# Patient Record
Sex: MALE | Race: WHITE | Employment: OTHER | URBAN - METROPOLITAN AREA
[De-identification: names, ages, dates, MRNs, and addresses within clinical notes are randomized per-mention and may not be internally consistent; named-entity substitution may affect disease eponyms.]

---

## 2023-08-03 DIAGNOSIS — M19.90 OSTEOARTHRITIS, UNSPECIFIED OSTEOARTHRITIS TYPE, UNSPECIFIED SITE: ICD-10-CM

## 2023-08-03 PROBLEM — L03.90 CELLULITIS: Status: ACTIVE | Noted: 2023-08-03

## 2023-08-03 PROBLEM — M54.50 LOWER BACK PAIN: Status: ACTIVE | Noted: 2023-08-03

## 2023-08-03 PROBLEM — H81.399 VESTIBULAR VERTIGO: Status: ACTIVE | Noted: 2023-08-03

## 2023-08-03 PROBLEM — S81.802A WOUND OF LEFT LOWER EXTREMITY: Status: ACTIVE | Noted: 2023-08-03

## 2023-08-03 PROBLEM — R21 RASH: Status: ACTIVE | Noted: 2023-08-03

## 2023-08-03 PROBLEM — R05.9 COUGH IN ADULT: Status: ACTIVE | Noted: 2023-08-03

## 2023-08-03 PROBLEM — J01.10 ACUTE FRONTAL SINUSITIS: Status: ACTIVE | Noted: 2023-08-03

## 2023-08-03 PROBLEM — J30.9 ALLERGIC RHINITIS: Status: ACTIVE | Noted: 2023-08-03

## 2023-08-03 PROBLEM — J20.9 ACUTE BRONCHITIS: Status: ACTIVE | Noted: 2023-08-03

## 2023-08-03 PROBLEM — I48.91 AFIB (MULTI): Status: ACTIVE | Noted: 2023-08-03

## 2023-08-03 PROBLEM — E66.09 EXOGENOUS OBESITY: Status: ACTIVE | Noted: 2023-08-03

## 2023-08-03 PROBLEM — F41.9 ANXIETY: Status: ACTIVE | Noted: 2023-08-03

## 2023-08-03 PROBLEM — F52.4 PREMATURE EJACULATION: Status: ACTIVE | Noted: 2023-08-03

## 2023-08-03 PROBLEM — J32.9 CHRONIC SINUSITIS: Status: ACTIVE | Noted: 2023-08-03

## 2023-08-03 PROBLEM — N39.0 UTI (URINARY TRACT INFECTION): Status: ACTIVE | Noted: 2023-08-03

## 2023-08-03 PROBLEM — R31.9 BLOOD IN URINE: Status: ACTIVE | Noted: 2023-08-03

## 2023-08-03 PROBLEM — M25.569 PAIN, KNEE: Status: ACTIVE | Noted: 2023-08-03

## 2023-08-03 PROBLEM — I10 HYPERTENSION: Status: ACTIVE | Noted: 2023-08-03

## 2023-08-03 PROBLEM — H61.20 CERUMEN IMPACTION: Status: ACTIVE | Noted: 2023-08-03

## 2023-08-03 PROBLEM — G51.0 BELL'S PALSY: Status: ACTIVE | Noted: 2023-08-03

## 2023-08-03 PROBLEM — R35.1 NOCTURIA: Status: ACTIVE | Noted: 2023-08-03

## 2023-08-03 PROBLEM — E55.9 VITAMIN D DEFICIENCY: Status: ACTIVE | Noted: 2023-08-03

## 2023-08-03 PROBLEM — H92.09: Status: ACTIVE | Noted: 2023-08-03

## 2023-08-03 PROBLEM — B37.2 CUTANEOUS CANDIDIASIS: Status: ACTIVE | Noted: 2023-08-03

## 2023-08-03 PROBLEM — E03.9 HYPOTHYROIDISM: Status: ACTIVE | Noted: 2023-08-03

## 2023-08-03 PROBLEM — E78.5 HYPERLIPIDEMIA: Status: ACTIVE | Noted: 2023-08-03

## 2023-08-03 RX ORDER — MELOXICAM 7.5 MG/1
7.5 TABLET ORAL
Qty: 60 TABLET | Refills: 0 | Status: SHIPPED | OUTPATIENT
Start: 2023-08-03

## 2023-08-03 RX ORDER — MELOXICAM 7.5 MG/1
7.5 TABLET ORAL
COMMUNITY
End: 2023-08-03 | Stop reason: SDUPTHER

## 2024-02-05 ENCOUNTER — OFFICE VISIT (OUTPATIENT)
Dept: PRIMARY CARE | Facility: CLINIC | Age: 79
End: 2024-02-05
Payer: MEDICARE

## 2024-02-05 VITALS
HEART RATE: 79 BPM | OXYGEN SATURATION: 92 % | BODY MASS INDEX: 43.08 KG/M2 | DIASTOLIC BLOOD PRESSURE: 81 MMHG | RESPIRATION RATE: 14 BRPM | WEIGHT: 228 LBS | SYSTOLIC BLOOD PRESSURE: 135 MMHG

## 2024-02-05 DIAGNOSIS — M25.569 KNEE PAIN, UNSPECIFIED CHRONICITY, UNSPECIFIED LATERALITY: ICD-10-CM

## 2024-02-05 DIAGNOSIS — R35.0 URINARY FREQUENCY: Primary | ICD-10-CM

## 2024-02-05 PROCEDURE — 1125F AMNT PAIN NOTED PAIN PRSNT: CPT | Performed by: INTERNAL MEDICINE

## 2024-02-05 PROCEDURE — 1036F TOBACCO NON-USER: CPT | Performed by: INTERNAL MEDICINE

## 2024-02-05 PROCEDURE — 99214 OFFICE O/P EST MOD 30 MIN: CPT | Performed by: INTERNAL MEDICINE

## 2024-02-05 PROCEDURE — 1159F MED LIST DOCD IN RCRD: CPT | Performed by: INTERNAL MEDICINE

## 2024-02-05 PROCEDURE — 3075F SYST BP GE 130 - 139MM HG: CPT | Performed by: INTERNAL MEDICINE

## 2024-02-05 PROCEDURE — 81003 URINALYSIS AUTO W/O SCOPE: CPT | Performed by: INTERNAL MEDICINE

## 2024-02-05 PROCEDURE — 3079F DIAST BP 80-89 MM HG: CPT | Performed by: INTERNAL MEDICINE

## 2024-02-05 RX ORDER — ALPRAZOLAM 0.5 MG/1
1 TABLET ORAL 3 TIMES DAILY PRN
COMMUNITY
Start: 2013-04-23

## 2024-02-05 RX ORDER — ALFUZOSIN HYDROCHLORIDE 10 MG/1
1 TABLET, EXTENDED RELEASE ORAL DAILY
COMMUNITY

## 2024-02-05 RX ORDER — AMLODIPINE BESYLATE 10 MG/1
10 TABLET ORAL DAILY
COMMUNITY
Start: 2010-05-19

## 2024-02-05 RX ORDER — ROSUVASTATIN CALCIUM 10 MG/1
10 TABLET, COATED ORAL DAILY
COMMUNITY

## 2024-02-05 RX ORDER — LORATADINE 10 MG/1
1 TABLET ORAL NIGHTLY
COMMUNITY
Start: 2013-04-23

## 2024-02-05 RX ORDER — FLUTICASONE PROPIONATE 50 MCG
1 SPRAY, SUSPENSION (ML) NASAL DAILY
COMMUNITY
Start: 2013-05-08

## 2024-02-05 RX ORDER — SPIRONOLACTONE 25 MG/1
1 TABLET ORAL 2 TIMES DAILY
COMMUNITY
Start: 2018-11-21

## 2024-02-05 RX ORDER — FINASTERIDE 5 MG/1
1 TABLET, FILM COATED ORAL DAILY
COMMUNITY

## 2024-02-05 RX ORDER — AMIODARONE HYDROCHLORIDE 200 MG/1
200 TABLET ORAL DAILY
COMMUNITY

## 2024-02-05 RX ORDER — VIT C/E/ZN/COPPR/LUTEIN/ZEAXAN 250MG-90MG
50 CAPSULE ORAL DAILY
COMMUNITY
Start: 2013-04-23

## 2024-02-05 RX ORDER — MELOXICAM 7.5 MG/1
7.5 TABLET ORAL 2 TIMES DAILY
Qty: 60 TABLET | Refills: 3 | Status: SHIPPED | OUTPATIENT
Start: 2024-02-05 | End: 2024-06-04

## 2024-02-05 RX ORDER — CARVEDILOL 25 MG/1
25 TABLET ORAL 2 TIMES DAILY
COMMUNITY
Start: 2013-04-23

## 2024-02-05 RX ORDER — CIPROFLOXACIN 500 MG/1
500 TABLET ORAL 2 TIMES DAILY
Qty: 20 TABLET | Refills: 0 | Status: SHIPPED | OUTPATIENT
Start: 2024-02-05 | End: 2024-02-15

## 2024-02-05 ASSESSMENT — PAIN SCALES - GENERAL: PAINLEVEL: 4

## 2024-02-05 ASSESSMENT — ENCOUNTER SYMPTOMS: SHORTNESS OF BREATH: 1

## 2024-02-05 NOTE — ASSESSMENT & PLAN NOTE
Continue taking Alfuzosin 10mg every day and Finasteride 5mg every day. Take Cipro 500mg BID for 10 days to see if symptoms subside. If symptoms of incomplete emptying and frequent urination with interupted stream see urology.     Referral placed today.

## 2024-02-05 NOTE — PROGRESS NOTES
Subjective   Chief complaint: Shaw Foreman is a 78 y.o. male who presents for Urinary Retention (Pt being seen for difficulty urinating. Pt c/o not emptying bladder fully and urinating small amounts frequently) and Shortness of Breath (Pt c/o SOB when ambulating ).    HPI:  Shaw has presented for frequency of urination.     1 wk of urinary frequency and he has frequent trips to the bathroom, but only small amount comes out. He says sometimes he has to keep going back after a minute or two.   Denies blood or pain.     Shortness of Breath        Objective   /81   Pulse 79   Resp 14   Wt 103 kg (228 lb)   SpO2 92%   BMI 43.08 kg/m²   Physical Exam  Vitals reviewed.   Neurological:      Mental Status: He is alert.         I have reviewed and reconciled the medication list with the patient today.   Current Outpatient Medications:     alfuzosin (Uroxatral) 10 mg 24 hr tablet, Take 1 tablet (10 mg) by mouth once daily., Disp: , Rfl:     ALPRAZolam (Xanax) 0.5 mg tablet, Take 1 tablet (0.5 mg) by mouth 3 times a day as needed for anxiety., Disp: , Rfl:     amiodarone (Pacerone) 200 mg tablet, Take 1 tablet (200 mg) by mouth once daily., Disp: , Rfl:     amLODIPine (Norvasc) 10 mg tablet, Take 1 tablet (10 mg) by mouth once daily., Disp: , Rfl:     apixaban (Eliquis) 5 mg tablet, Take 1 tablet (5 mg) by mouth 2 times a day., Disp: , Rfl:     carvedilol (Coreg) 25 mg tablet, Take 1 tablet (25 mg) by mouth twice a day., Disp: , Rfl:     cholecalciferol (Vitamin D-3) 25 MCG (1000 UT) capsule, Take 2 capsules (50 mcg) by mouth once daily., Disp: , Rfl:     empagliflozin (Jardiance) 25 mg, Take 1 tablet (25 mg) by mouth once daily., Disp: , Rfl:     finasteride (Proscar) 5 mg tablet, Take 1 tablet (5 mg) by mouth once daily., Disp: , Rfl:     fish oil concentrate (Omega-3) 120-180 mg capsule, Take 1 capsule (1 g) by mouth every 12 hours., Disp: , Rfl:     fluticasone (Flonase) 50 mcg/actuation nasal spray, Administer  1 spray into each nostril once daily., Disp: , Rfl:     loratadine (Claritin) 10 mg tablet, Take 1 tablet (10 mg) by mouth once daily at bedtime., Disp: , Rfl:     meloxicam (Mobic) 7.5 mg tablet, Take 1 tablet (7.5 mg) by mouth 2 times a day with meals., Disp: 60 tablet, Rfl: 0    rosuvastatin (Crestor) 10 mg tablet, Take 1 tablet (10 mg) by mouth once daily., Disp: , Rfl:     spironolactone (Aldactone) 25 mg tablet, Take 1 tablet (25 mg) by mouth 2 times a day., Disp: , Rfl:      Imaging:  No results found.     Labs reviewed:    Lab Results   Component Value Date    WBC 12.4 (H) 03/04/2021    HGB 15.4 03/04/2021    HCT 46.5 03/04/2021     03/04/2021    CHOL 107 03/03/2021    TRIG 80 03/03/2021    HDL 50.0 03/03/2021    ALT 24 03/02/2021    AST 20 03/02/2021     03/04/2021    K 4.2 03/04/2021     03/04/2021    CREATININE 0.75 03/04/2021    BUN 17 03/04/2021    CO2 26 03/04/2021    TSH 0.22 (L) 03/02/2021    INR 1.7 (H) 03/02/2021    HGBA1C 5.8 03/03/2021       Assessment/Plan   Problem List Items Addressed This Visit          Genitourinary and Reproductive    Urinary frequency - Primary     Continue taking Alfuzosin 10mg every day and Finasteride 5mg every day. Take Cipro 500mg BID for 10 days to see if symptoms subside. If symptoms of incomplete emptying and frequent urination with interupted stream see urology.     Referral placed today.             Musculoskeletal and Injuries    Pain, knee       Continue current medications as listed- Take cirpo for 10 days.   [facility]. Follow-up with this office as needed.

## 2024-08-17 DIAGNOSIS — M19.90 OSTEOARTHRITIS, UNSPECIFIED OSTEOARTHRITIS TYPE, UNSPECIFIED SITE: ICD-10-CM

## 2024-08-19 RX ORDER — MELOXICAM 7.5 MG/1
TABLET ORAL
Qty: 60 TABLET | Refills: 0 | Status: SHIPPED | OUTPATIENT
Start: 2024-08-19

## 2024-12-13 DIAGNOSIS — M19.90 OSTEOARTHRITIS, UNSPECIFIED OSTEOARTHRITIS TYPE, UNSPECIFIED SITE: ICD-10-CM

## 2024-12-13 RX ORDER — MELOXICAM 7.5 MG/1
7.5 TABLET ORAL DAILY
Qty: 60 TABLET | Refills: 0 | Status: SHIPPED | OUTPATIENT
Start: 2024-12-13